# Patient Record
Sex: FEMALE | Race: WHITE | URBAN - METROPOLITAN AREA
[De-identification: names, ages, dates, MRNs, and addresses within clinical notes are randomized per-mention and may not be internally consistent; named-entity substitution may affect disease eponyms.]

---

## 2017-04-18 ENCOUNTER — OUTPATIENT (OUTPATIENT)
Dept: OUTPATIENT SERVICES | Facility: HOSPITAL | Age: 62
LOS: 1 days | Discharge: HOME | End: 2017-04-18

## 2017-06-27 DIAGNOSIS — Z12.31 ENCOUNTER FOR SCREENING MAMMOGRAM FOR MALIGNANT NEOPLASM OF BREAST: ICD-10-CM

## 2018-02-23 ENCOUNTER — EMERGENCY (EMERGENCY)
Facility: HOSPITAL | Age: 63
LOS: 0 days | Discharge: HOME | End: 2018-02-23
Attending: EMERGENCY MEDICINE | Admitting: GENERAL PRACTICE

## 2018-02-23 VITALS
DIASTOLIC BLOOD PRESSURE: 66 MMHG | SYSTOLIC BLOOD PRESSURE: 141 MMHG | TEMPERATURE: 96 F | RESPIRATION RATE: 20 BRPM | HEART RATE: 78 BPM | OXYGEN SATURATION: 98 %

## 2018-02-23 VITALS
DIASTOLIC BLOOD PRESSURE: 61 MMHG | SYSTOLIC BLOOD PRESSURE: 133 MMHG | RESPIRATION RATE: 18 BRPM | HEART RATE: 80 BPM | TEMPERATURE: 98 F | OXYGEN SATURATION: 100 %

## 2018-02-23 DIAGNOSIS — Z23 ENCOUNTER FOR IMMUNIZATION: ICD-10-CM
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
## 2018-02-23 DIAGNOSIS — R55 SYNCOPE AND COLLAPSE: ICD-10-CM
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
## 2018-02-23 DIAGNOSIS — S01.01XA LACERATION WITHOUT FOREIGN BODY OF SCALP, INITIAL ENCOUNTER: ICD-10-CM

## 2018-02-23 DIAGNOSIS — Z88.2 ALLERGY STATUS TO SULFONAMIDES: ICD-10-CM
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
## 2018-02-23 DIAGNOSIS — Z88.0 ALLERGY STATUS TO PENICILLIN: ICD-10-CM

## 2018-02-23 DIAGNOSIS — S09.90XA UNSPECIFIED INJURY OF HEAD, INITIAL ENCOUNTER: ICD-10-CM

## 2018-02-23 RX ORDER — TETANUS TOXOID, REDUCED DIPHTHERIA TOXOID AND ACELLULAR PERTUSSIS VACCINE, ADSORBED 5; 2.5; 8; 8; 2.5 [IU]/.5ML; [IU]/.5ML; UG/.5ML; UG/.5ML; UG/.5ML
0.5 SUSPENSION INTRAMUSCULAR ONCE
Qty: 0 | Refills: 0 | Status: COMPLETED | OUTPATIENT
Start: 2018-02-23 | End: 2018-02-23

## 2018-02-23 RX ORDER — SODIUM CHLORIDE 9 MG/ML
1000 INJECTION INTRAMUSCULAR; INTRAVENOUS; SUBCUTANEOUS ONCE
Qty: 0 | Refills: 0 | Status: COMPLETED | OUTPATIENT
Start: 2018-02-23 | End: 2018-02-23

## 2018-02-23 RX ADMIN — SODIUM CHLORIDE 6000 MILLILITER(S): 9 INJECTION INTRAMUSCULAR; INTRAVENOUS; SUBCUTANEOUS at 08:15

## 2018-02-23 RX ADMIN — TETANUS TOXOID, REDUCED DIPHTHERIA TOXOID AND ACELLULAR PERTUSSIS VACCINE, ADSORBED 0.5 MILLILITER(S): 5; 2.5; 8; 8; 2.5 SUSPENSION INTRAMUSCULAR at 07:14

## 2018-02-23 NOTE — ED PROVIDER NOTE - CARE PLAN
Principal Discharge DX:	Scalp laceration, initial encounter  Secondary Diagnosis:	Vasovagal syncope  Secondary Diagnosis:	Closed head injury, initial encounter

## 2018-02-23 NOTE — ED ADULT NURSE NOTE - OBJECTIVE STATEMENT
pt states she was recently in [facility] and was diagnosed with e.coli in stool, pt has loose bm and abd bloating. approx 3am pt felt abd cramps and ambulated to the bathroom when she felt dizzed and fainted, pt had + LOC, spent approx 1hr in bathroom with diarrhea and vomiting. pt noted to had L sided head lac with mild bleeding noted, pt currently c/o headache and dizziness as well as L elbow pain

## 2018-02-23 NOTE — ED PROVIDER NOTE - NS ED ROS FT
Constitutional: (-) fever  Eyes/ENT: (-) blurry vision, (-) epistaxis  Cardiovascular: (-) chest pain, (+) syncope  Respiratory: (-) cough, (-) shortness of breath  Gastrointestinal: (-) vomiting, (-) diarrhea  Musculoskeletal: (-) neck pain, (-) back pain, (-) joint pain  Integumentary: (-) rash, (-) edema  Neurological: (-) headache, (-) altered mental status  Psychiatric: (-) hallucinations  Allergic/Immunologic: (-) pruritus

## 2018-02-23 NOTE — ED PROVIDER NOTE - PHYSICAL EXAMINATION
Vital Signs: I have reviewed the initial vital signs.  Constitutional: well-nourished, appears stated age, no acute distress  Cardiovascular: regular rate, regular rhythm, well-perfused extremities  Respiratory: unlabored respiratory effort, clear to auscultation bilaterally  Gastrointestinal: soft, non-tender abdomen, no pulsatile mass  Musculoskeletal: supple neck, no lower extremity edema, no calf tenderness  Integumentary: warm, dry, no rash; 2cm scalp laceration  Neurologic: awake, alert, cranial nerves II-XII grossly intact, extremities’ motor and sensory functions grossly intact  Psychiatric: appropriate mood, appropriate affect

## 2018-03-08 ENCOUNTER — EMERGENCY (EMERGENCY)
Facility: HOSPITAL | Age: 63
LOS: 0 days | Discharge: HOME | End: 2018-03-08
Attending: EMERGENCY MEDICINE | Admitting: GENERAL PRACTICE

## 2018-03-08 VITALS
SYSTOLIC BLOOD PRESSURE: 145 MMHG | RESPIRATION RATE: 18 BRPM | DIASTOLIC BLOOD PRESSURE: 70 MMHG | OXYGEN SATURATION: 100 % | HEART RATE: 68 BPM | TEMPERATURE: 96 F

## 2018-03-08 DIAGNOSIS — R51 HEADACHE: ICD-10-CM

## 2018-03-08 DIAGNOSIS — S06.0X9A CONCUSSION WITH LOSS OF CONSCIOUSNESS OF UNSPECIFIED DURATION, INITIAL ENCOUNTER: ICD-10-CM
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
## 2018-03-08 DIAGNOSIS — Z88.2 ALLERGY STATUS TO SULFONAMIDES: ICD-10-CM
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
## 2018-03-08 DIAGNOSIS — Z88.1 ALLERGY STATUS TO OTHER ANTIBIOTIC AGENTS STATUS: ICD-10-CM
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
## 2018-03-08 LAB
ALBUMIN SERPL ELPH-MCNC: 4.2 G/DL — SIGNIFICANT CHANGE UP (ref 3–5.5)
ALP SERPL-CCNC: 97 U/L — SIGNIFICANT CHANGE UP (ref 30–115)
ALT FLD-CCNC: 34 U/L — SIGNIFICANT CHANGE UP (ref 0–41)
ANION GAP SERPL CALC-SCNC: 5 MMOL/L — LOW (ref 7–14)
AST SERPL-CCNC: 31 U/L — SIGNIFICANT CHANGE UP (ref 0–41)
BASOPHILS # BLD AUTO: 0.03 K/UL — SIGNIFICANT CHANGE UP (ref 0–0.2)
BASOPHILS NFR BLD AUTO: 0.5 % — SIGNIFICANT CHANGE UP (ref 0–1)
BILIRUB SERPL-MCNC: 0.4 MG/DL — SIGNIFICANT CHANGE UP (ref 0.2–1.2)
BUN SERPL-MCNC: 8 MG/DL — LOW (ref 10–20)
CALCIUM SERPL-MCNC: 9.9 MG/DL — SIGNIFICANT CHANGE UP (ref 8.5–10.1)
CHLORIDE SERPL-SCNC: 107 MMOL/L — SIGNIFICANT CHANGE UP (ref 98–110)
CO2 SERPL-SCNC: 29 MMOL/L — SIGNIFICANT CHANGE UP (ref 17–32)
CREAT SERPL-MCNC: 0.8 MG/DL — SIGNIFICANT CHANGE UP (ref 0.7–1.5)
EOSINOPHIL # BLD AUTO: 0.18 K/UL — SIGNIFICANT CHANGE UP (ref 0–0.7)
EOSINOPHIL NFR BLD AUTO: 3.1 % — SIGNIFICANT CHANGE UP (ref 0–8)
GLUCOSE SERPL-MCNC: 95 MG/DL — SIGNIFICANT CHANGE UP (ref 70–110)
HCT VFR BLD CALC: 38.7 % — SIGNIFICANT CHANGE UP (ref 37–47)
HGB BLD-MCNC: 13.1 G/DL — SIGNIFICANT CHANGE UP (ref 12–16)
IMM GRANULOCYTES NFR BLD AUTO: 0.2 % — SIGNIFICANT CHANGE UP (ref 0.1–0.3)
LYMPHOCYTES # BLD AUTO: 1.3 K/UL — SIGNIFICANT CHANGE UP (ref 1.2–3.4)
LYMPHOCYTES # BLD AUTO: 22.4 % — SIGNIFICANT CHANGE UP (ref 20.5–51.1)
MCHC RBC-ENTMCNC: 30.7 PG — SIGNIFICANT CHANGE UP (ref 27–31)
MCHC RBC-ENTMCNC: 33.9 G/DL — SIGNIFICANT CHANGE UP (ref 32–37)
MCV RBC AUTO: 90.6 FL — SIGNIFICANT CHANGE UP (ref 81–99)
MONOCYTES # BLD AUTO: 0.48 K/UL — SIGNIFICANT CHANGE UP (ref 0.1–0.6)
MONOCYTES NFR BLD AUTO: 8.3 % — SIGNIFICANT CHANGE UP (ref 1.7–9.3)
NEUTROPHILS # BLD AUTO: 3.81 K/UL — SIGNIFICANT CHANGE UP (ref 1.4–6.5)
NEUTROPHILS NFR BLD AUTO: 65.5 % — SIGNIFICANT CHANGE UP (ref 42.2–75.2)
NRBC # BLD: 0 /100 WBCS — SIGNIFICANT CHANGE UP (ref 0–0)
PLATELET # BLD AUTO: 289 K/UL — SIGNIFICANT CHANGE UP (ref 130–400)
POTASSIUM SERPL-MCNC: 4 MMOL/L — SIGNIFICANT CHANGE UP (ref 3.5–5)
POTASSIUM SERPL-SCNC: 4 MMOL/L — SIGNIFICANT CHANGE UP (ref 3.5–5)
PROT SERPL-MCNC: 6.9 G/DL — SIGNIFICANT CHANGE UP (ref 6–8)
RBC # BLD: 4.27 M/UL — SIGNIFICANT CHANGE UP (ref 4.2–5.4)
RBC # FLD: 13 % — SIGNIFICANT CHANGE UP (ref 11.5–14.5)
SODIUM SERPL-SCNC: 141 MMOL/L — SIGNIFICANT CHANGE UP (ref 135–146)
WBC # BLD: 5.81 K/UL — SIGNIFICANT CHANGE UP (ref 4.8–10.8)
WBC # FLD AUTO: 5.81 K/UL — SIGNIFICANT CHANGE UP (ref 4.8–10.8)

## 2018-03-08 RX ORDER — METOCLOPRAMIDE HCL 10 MG
10 TABLET ORAL ONCE
Qty: 0 | Refills: 0 | Status: COMPLETED | OUTPATIENT
Start: 2018-03-08 | End: 2018-03-08

## 2018-03-08 RX ORDER — SODIUM CHLORIDE 9 MG/ML
1000 INJECTION INTRAMUSCULAR; INTRAVENOUS; SUBCUTANEOUS ONCE
Qty: 0 | Refills: 0 | Status: COMPLETED | OUTPATIENT
Start: 2018-03-08 | End: 2018-03-08

## 2018-03-08 RX ADMIN — Medication 104 MILLIGRAM(S): at 15:44

## 2018-03-08 RX ADMIN — SODIUM CHLORIDE 2400 MILLILITER(S): 9 INJECTION INTRAMUSCULAR; INTRAVENOUS; SUBCUTANEOUS at 16:14

## 2018-03-08 NOTE — ED ADULT NURSE NOTE - OBJECTIVE STATEMENT
pt came to the ER today referred from PMD for MRI of head and c/o dizziness and headache. . pt denies any chest pain at this time. pt states she fell x2 weeks ago received stiches to back of head feels that head is swollen . locates pain to left side of head at site of stitches.

## 2018-03-08 NOTE — ED PROVIDER NOTE - PHYSICAL EXAMINATION
CON: ao x 3, HENMT: clear oropharynx, soft neck, HEAD: atraumatic, CV: rrr, equal pulses b/l, RESP: cta b/l, GI:  soft, nontender, no rebound, no guarding, SKIN: well healing laceration to left posterior scalp, MSK: no deformities, NEURO: motor 5/5 X4, sensation grossly intact CON: ao x 3, HENMT: clear oropharynx, soft neck, HEAD: atraumatic, CV: rrr, equal pulses b/l, RESP: cta b/l, GI:  soft, nontender, no rebound, no guarding, SKIN: well healing laceration to left posterior scalp, MSK: no deformities, NEURO: motor 5/5 X4, sensation grossly intact, no nystagmus, normal finger to nose, normal speech, mild ataxia with walking. no ataxia while sitting

## 2018-03-08 NOTE — ED PROVIDER NOTE - PROGRESS NOTE DETAILS
Plan- Explain to pt that we are unable to get MRI, will repeat CT head to insure no delayed bleeding although SX more likely concussion-like, EKG and screening labs. pt comftorable with dc and outpt f/u

## 2018-03-08 NOTE — ED PROVIDER NOTE - NS ED ROS FT
Constitutional: No fever/chills.  Eyes: No visual changes.   ENMT: No hearing changes.   Cardiac: No chest pain, SOB or edema.  Respiratory: No cough or respiratory distress.   GI: No nausea, vomiting, diarrhea or abdominal pain.  : No dysuria, frequency or burning.  MS: No myalgia, muscle weakness, joint pain or back pain.  Neuro: See HPI.   Skin: See HPI.   Except as documented in the HPI, all other systems are negative.

## 2018-11-28 ENCOUNTER — OUTPATIENT (OUTPATIENT)
Dept: OUTPATIENT SERVICES | Facility: HOSPITAL | Age: 63
LOS: 1 days | Discharge: HOME | End: 2018-11-28

## 2018-11-28 DIAGNOSIS — Z12.31 ENCOUNTER FOR SCREENING MAMMOGRAM FOR MALIGNANT NEOPLASM OF BREAST: ICD-10-CM

## 2018-11-28 PROBLEM — M50.20 OTHER CERVICAL DISC DISPLACEMENT, UNSPECIFIED CERVICAL REGION: Chronic | Status: ACTIVE | Noted: 2018-02-23

## 2018-11-28 PROBLEM — R55 SYNCOPE AND COLLAPSE: Chronic | Status: ACTIVE | Noted: 2018-02-23

## 2018-11-28 PROBLEM — M19.90 UNSPECIFIED OSTEOARTHRITIS, UNSPECIFIED SITE: Chronic | Status: ACTIVE | Noted: 2018-02-23

## 2018-11-28 PROBLEM — G56.00 CARPAL TUNNEL SYNDROME, UNSPECIFIED UPPER LIMB: Chronic | Status: ACTIVE | Noted: 2018-02-23

## 2018-12-05 ENCOUNTER — OUTPATIENT (OUTPATIENT)
Dept: OUTPATIENT SERVICES | Facility: HOSPITAL | Age: 63
LOS: 1 days | Discharge: HOME | End: 2018-12-05

## 2018-12-05 DIAGNOSIS — M06.9 RHEUMATOID ARTHRITIS, UNSPECIFIED: ICD-10-CM

## 2018-12-05 DIAGNOSIS — R79.89 OTHER SPECIFIED ABNORMAL FINDINGS OF BLOOD CHEMISTRY: ICD-10-CM

## 2018-12-05 DIAGNOSIS — M34.9 SYSTEMIC SCLEROSIS, UNSPECIFIED: ICD-10-CM

## 2018-12-05 DIAGNOSIS — M32.10 SYSTEMIC LUPUS ERYTHEMATOSUS, ORGAN OR SYSTEM INVOLVEMENT UNSPECIFIED: ICD-10-CM

## 2018-12-05 DIAGNOSIS — M34.81 SYSTEMIC SCLEROSIS WITH LUNG INVOLVEMENT: ICD-10-CM

## 2018-12-05 DIAGNOSIS — E55.9 VITAMIN D DEFICIENCY, UNSPECIFIED: ICD-10-CM

## 2018-12-05 DIAGNOSIS — M06.4 INFLAMMATORY POLYARTHROPATHY: ICD-10-CM

## 2018-12-05 DIAGNOSIS — R80.9 PROTEINURIA, UNSPECIFIED: ICD-10-CM

## 2018-12-05 DIAGNOSIS — D84.1 DEFECTS IN THE COMPLEMENT SYSTEM: ICD-10-CM

## 2019-02-16 ENCOUNTER — OUTPATIENT (OUTPATIENT)
Dept: OUTPATIENT SERVICES | Facility: HOSPITAL | Age: 64
LOS: 1 days | Discharge: HOME | End: 2019-02-16

## 2019-02-16 DIAGNOSIS — R79.89 OTHER SPECIFIED ABNORMAL FINDINGS OF BLOOD CHEMISTRY: ICD-10-CM

## 2019-02-16 DIAGNOSIS — M06.4 INFLAMMATORY POLYARTHROPATHY: ICD-10-CM

## 2019-02-16 DIAGNOSIS — D50.8 OTHER IRON DEFICIENCY ANEMIAS: ICD-10-CM

## 2019-04-03 ENCOUNTER — OUTPATIENT (OUTPATIENT)
Dept: OUTPATIENT SERVICES | Facility: HOSPITAL | Age: 64
LOS: 1 days | Discharge: HOME | End: 2019-04-03

## 2019-04-03 DIAGNOSIS — R79.89 OTHER SPECIFIED ABNORMAL FINDINGS OF BLOOD CHEMISTRY: ICD-10-CM

## 2019-10-14 ENCOUNTER — OUTPATIENT (OUTPATIENT)
Dept: OUTPATIENT SERVICES | Facility: HOSPITAL | Age: 64
LOS: 1 days | Discharge: HOME | End: 2019-10-14

## 2019-10-14 DIAGNOSIS — R79.89 OTHER SPECIFIED ABNORMAL FINDINGS OF BLOOD CHEMISTRY: ICD-10-CM

## 2019-10-14 DIAGNOSIS — R68.89 OTHER GENERAL SYMPTOMS AND SIGNS: ICD-10-CM

## 2019-10-15 ENCOUNTER — OUTPATIENT (OUTPATIENT)
Dept: OUTPATIENT SERVICES | Facility: HOSPITAL | Age: 64
LOS: 1 days | Discharge: HOME | End: 2019-10-15

## 2019-10-15 DIAGNOSIS — M34.81 SYSTEMIC SCLEROSIS WITH LUNG INVOLVEMENT: ICD-10-CM

## 2019-10-15 DIAGNOSIS — M06.4 INFLAMMATORY POLYARTHROPATHY: ICD-10-CM

## 2019-10-15 DIAGNOSIS — D84.1 DEFECTS IN THE COMPLEMENT SYSTEM: ICD-10-CM

## 2019-11-25 ENCOUNTER — OUTPATIENT (OUTPATIENT)
Dept: OUTPATIENT SERVICES | Facility: HOSPITAL | Age: 64
LOS: 1 days | Discharge: HOME | End: 2019-11-25

## 2019-11-25 DIAGNOSIS — E78.2 MIXED HYPERLIPIDEMIA: ICD-10-CM

## 2019-11-25 DIAGNOSIS — Z00.00 ENCOUNTER FOR GENERAL ADULT MEDICAL EXAMINATION WITHOUT ABNORMAL FINDINGS: ICD-10-CM

## 2019-11-25 DIAGNOSIS — Z79.899 OTHER LONG TERM (CURRENT) DRUG THERAPY: ICD-10-CM

## 2019-11-25 DIAGNOSIS — R53.81 OTHER MALAISE: ICD-10-CM

## 2019-11-25 DIAGNOSIS — R53.83 OTHER FATIGUE: ICD-10-CM

## 2019-11-25 DIAGNOSIS — E11.9 TYPE 2 DIABETES MELLITUS WITHOUT COMPLICATIONS: ICD-10-CM

## 2019-12-20 ENCOUNTER — OUTPATIENT (OUTPATIENT)
Dept: OUTPATIENT SERVICES | Facility: HOSPITAL | Age: 64
LOS: 1 days | Discharge: HOME | End: 2019-12-20
Payer: MEDICARE

## 2019-12-20 DIAGNOSIS — Z12.31 ENCOUNTER FOR SCREENING MAMMOGRAM FOR MALIGNANT NEOPLASM OF BREAST: ICD-10-CM

## 2019-12-20 PROCEDURE — 77063 BREAST TOMOSYNTHESIS BI: CPT | Mod: 26

## 2019-12-20 PROCEDURE — 77067 SCR MAMMO BI INCL CAD: CPT | Mod: 26

## 2020-07-06 NOTE — ED PROVIDER NOTE - OBJECTIVE STATEMENT
Pt is a 63yo female with hx of sarcoidosis and vasovagal syncope who woke up from sleep to use the bathroom and got up but felt clammy and fainted when walking to the bathroom.  No CP/SOB/palpitations.  Pt asymptomatic now but had hit the back of her head and sustained a laceration.  No vomiting, no AMS.
egd w/bx

## 2021-02-02 ENCOUNTER — NON-APPOINTMENT (OUTPATIENT)
Age: 66
End: 2021-02-02

## 2021-02-03 ENCOUNTER — NON-APPOINTMENT (OUTPATIENT)
Age: 66
End: 2021-02-03

## 2021-02-03 PROBLEM — Z00.00 ENCOUNTER FOR PREVENTIVE HEALTH EXAMINATION: Status: ACTIVE | Noted: 2021-02-03

## 2021-02-04 ENCOUNTER — NON-APPOINTMENT (OUTPATIENT)
Age: 66
End: 2021-02-04

## 2021-02-04 ENCOUNTER — APPOINTMENT (OUTPATIENT)
Dept: OTOLARYNGOLOGY | Facility: CLINIC | Age: 66
End: 2021-02-04
Payer: MEDICARE

## 2021-02-04 DIAGNOSIS — F32.9 ANXIETY DISORDER, UNSPECIFIED: ICD-10-CM

## 2021-02-04 DIAGNOSIS — D64.9 ANEMIA, UNSPECIFIED: ICD-10-CM

## 2021-02-04 DIAGNOSIS — F41.9 ANXIETY DISORDER, UNSPECIFIED: ICD-10-CM

## 2021-02-04 DIAGNOSIS — J34.2 DEVIATED NASAL SEPTUM: ICD-10-CM

## 2021-02-04 PROCEDURE — 99204 OFFICE O/P NEW MOD 45 MIN: CPT | Mod: 25

## 2021-02-04 PROCEDURE — 99072 ADDL SUPL MATRL&STAF TM PHE: CPT

## 2021-02-04 PROCEDURE — 31231 NASAL ENDOSCOPY DX: CPT

## 2021-02-04 RX ORDER — METHOTREXATE 2.5 MG/1
TABLET ORAL
Refills: 0 | Status: ACTIVE | COMMUNITY

## 2021-02-04 NOTE — HISTORY OF PRESENT ILLNESS
[de-identified] : Patient presents today with c/o post nasal drip. Patient states it has been going on for years. Recently becoming worse. She admits Sjogren which is making things worse- excessive dry mouth. Patient always clearing her throat. She feels something stuck in her throat. She has used nasal spray Flonase last year with no improvement. She does not know if she has any allergies, she has never been tested. She has not been treated with abx for these symptoms recently

## 2021-02-04 NOTE — PROCEDURE
[Flexible Endoscope] : examined with the flexible endoscope [Congested] : congested [Normal] : the paranasal sinuses had no abnormalities

## 2021-02-05 RX ORDER — AZELASTINE HYDROCHLORIDE 205.5 UG/1
0.15 SPRAY, METERED NASAL
Qty: 2 | Refills: 6 | Status: ACTIVE | COMMUNITY
Start: 2021-02-04 | End: 1900-01-01

## 2021-03-08 ENCOUNTER — APPOINTMENT (OUTPATIENT)
Dept: OTOLARYNGOLOGY | Facility: CLINIC | Age: 66
End: 2021-03-08
Payer: MEDICARE

## 2021-03-08 DIAGNOSIS — K21.9 GASTRO-ESOPHAGEAL REFLUX DISEASE W/OUT ESOPHAGITIS: ICD-10-CM

## 2021-03-08 PROCEDURE — 99214 OFFICE O/P EST MOD 30 MIN: CPT | Mod: 25

## 2021-03-08 PROCEDURE — 99072 ADDL SUPL MATRL&STAF TM PHE: CPT

## 2021-03-08 PROCEDURE — 31231 NASAL ENDOSCOPY DX: CPT

## 2021-03-08 NOTE — PROCEDURE
[Recalcitrant Symptoms] : recalcitrant symptoms  [Congested] : congested [Flexible Endoscope] : examined with the flexible endoscope [Normal] : posterior cricoid area had healthy pink mucosa in the interarytenoid area and the esophageal inlet

## 2021-03-08 NOTE — HISTORY OF PRESENT ILLNESS
[FreeTextEntry1] : Patient presents today following up on post nasal drip. Patient denies any improvement. Patient has been using nasal spray and allergy medication with no changes.

## 2021-03-08 NOTE — ASSESSMENT
[FreeTextEntry1] : Risks, benefits, and ablation of nasal swelling turbinate reduction were explained including but not limited to bleeding, infection, persistent symptoms, numbness, perforation, change in smell, change in taste, need for additional surgery, etc...\par

## 2021-03-25 ENCOUNTER — APPOINTMENT (OUTPATIENT)
Dept: OTOLARYNGOLOGY | Facility: CLINIC | Age: 66
End: 2021-03-25
Payer: MEDICARE

## 2021-03-25 PROCEDURE — 30930 THER FX NASAL INF TURBINATE: CPT | Mod: 59

## 2021-03-25 PROCEDURE — 99072 ADDL SUPL MATRL&STAF TM PHE: CPT

## 2021-03-25 PROCEDURE — 30802 ABLATE INF TURBINATE SUBMUC: CPT

## 2021-03-25 PROCEDURE — 30117 REMOVAL OF INTRANASAL LESION: CPT

## 2021-04-08 ENCOUNTER — APPOINTMENT (OUTPATIENT)
Dept: OTOLARYNGOLOGY | Facility: CLINIC | Age: 66
End: 2021-04-08
Payer: MEDICARE

## 2021-04-08 DIAGNOSIS — J30.0 VASOMOTOR RHINITIS: ICD-10-CM

## 2021-04-08 DIAGNOSIS — Z98.890 OTHER SPECIFIED POSTPROCEDURAL STATES: ICD-10-CM

## 2021-04-08 PROCEDURE — 99024 POSTOP FOLLOW-UP VISIT: CPT

## 2021-04-08 PROCEDURE — 31237 NSL/SINS NDSC SURG BX POLYPC: CPT | Mod: 50,58

## 2021-04-08 NOTE — HISTORY OF PRESENT ILLNESS
[FreeTextEntry1] : Patient presents today following up on post nasal drip. Patient is s/p turb reduction, clarifix 3/25/21. Patient c/o nasal congestion but improving . Patient still has post nasal drip but ut is much less severe.

## 2021-04-08 NOTE — PHYSICAL EXAM
[Nasal Endoscopy Performed] : nasal endoscopy was performed, see procedure section for findings [Midline] : trachea located in midline position [Normal] : no rashes [de-identified] : healing  [de-identified] : scabbing

## 2021-04-08 NOTE — PROCEDURE
[Recalcitrant Symptoms] : recalcitrant symptoms  [Rigid Endoscope] : examined with a rigid endoscope [Nasal Mucosa] : bilateral purulence [Paranasal Sinuses Middle Meatus] : bilateral purulence [Bilateral] : bilateral debridement of the nasal cavity [Normal] : the paranasal sinuses had no abnormalities [Severe] : severe [Ariel] : on both sides [Removed] : which was removed

## 2021-11-26 ENCOUNTER — EMERGENCY (EMERGENCY)
Facility: HOSPITAL | Age: 66
LOS: 0 days | Discharge: HOME | End: 2021-11-26
Payer: MEDICARE

## 2021-11-26 DIAGNOSIS — Z02.9 ENCOUNTER FOR ADMINISTRATIVE EXAMINATIONS, UNSPECIFIED: ICD-10-CM

## 2021-11-26 PROCEDURE — 99284 EMERGENCY DEPT VISIT MOD MDM: CPT

## 2021-12-20 ENCOUNTER — TRANSCRIPTION ENCOUNTER (OUTPATIENT)
Age: 66
End: 2021-12-20

## 2022-02-17 ENCOUNTER — APPOINTMENT (OUTPATIENT)
Age: 67
End: 2022-02-17
Payer: COMMERCIAL

## 2022-02-17 PROCEDURE — 99213 OFFICE O/P EST LOW 20 MIN: CPT | Mod: 25

## 2022-02-17 PROCEDURE — 71046 X-RAY EXAM CHEST 2 VIEWS: CPT

## 2022-02-17 NOTE — DISCUSSION/SUMMARY
[FreeTextEntry1] : SARCOIDOSIS STABLE\par CTD\par SMOKER/ CHEST CT / PFT RO COPD\par ON PLAQUENIL WAS ON MTX

## 2022-02-17 NOTE — PROCEDURE
[FreeTextEntry1] : CXR PA/ LAT SP COVID\par \par NO INFLIRATES/ EFFUSION\par \par IMP\par \par HYPERINFLATED LUNG

## 2022-02-17 NOTE — HISTORY OF PRESENT ILLNESS
[TextBox_4] : SAW MAC BEFORE SARCOIDOSIS/ SP MED, SJOGREN ON PLAQUENIL WAS ON MTX\par SP COVID DOING WELL\par CO JOINT PAIN

## 2022-03-02 ENCOUNTER — OUTPATIENT (OUTPATIENT)
Dept: OUTPATIENT SERVICES | Facility: HOSPITAL | Age: 67
LOS: 1 days | Discharge: HOME | End: 2022-03-02
Payer: MEDICARE

## 2022-03-02 ENCOUNTER — RESULT REVIEW (OUTPATIENT)
Age: 67
End: 2022-03-02

## 2022-03-02 DIAGNOSIS — R91.1 SOLITARY PULMONARY NODULE: ICD-10-CM

## 2022-03-02 PROCEDURE — 71250 CT THORAX DX C-: CPT | Mod: 26

## 2022-03-06 ENCOUNTER — TRANSCRIPTION ENCOUNTER (OUTPATIENT)
Age: 67
End: 2022-03-06

## 2022-03-11 ENCOUNTER — OUTPATIENT (OUTPATIENT)
Dept: OUTPATIENT SERVICES | Facility: HOSPITAL | Age: 67
LOS: 1 days | Discharge: HOME | End: 2022-03-11
Payer: MEDICARE

## 2022-03-11 DIAGNOSIS — Z12.31 ENCOUNTER FOR SCREENING MAMMOGRAM FOR MALIGNANT NEOPLASM OF BREAST: ICD-10-CM

## 2022-03-11 PROCEDURE — 77063 BREAST TOMOSYNTHESIS BI: CPT | Mod: 26

## 2022-03-11 PROCEDURE — 77067 SCR MAMMO BI INCL CAD: CPT | Mod: 26

## 2022-05-01 ENCOUNTER — NON-APPOINTMENT (OUTPATIENT)
Age: 67
End: 2022-05-01

## 2022-11-11 ENCOUNTER — APPOINTMENT (OUTPATIENT)
Age: 67
End: 2022-11-11

## 2022-11-11 VITALS
WEIGHT: 136 LBS | SYSTOLIC BLOOD PRESSURE: 116 MMHG | HEART RATE: 66 BPM | DIASTOLIC BLOOD PRESSURE: 64 MMHG | HEIGHT: 65 IN | OXYGEN SATURATION: 98 % | RESPIRATION RATE: 14 BRPM | BODY MASS INDEX: 22.66 KG/M2

## 2022-11-11 PROCEDURE — 99213 OFFICE O/P EST LOW 20 MIN: CPT

## 2022-11-11 NOTE — REASON FOR VISIT
[Follow-Up] : a follow-up visit [Cough] : cough [Sarcoidosis] : sarcoidosis [TextBox_44] : SARCOIDOSIS/ SMOKER

## 2022-11-11 NOTE — HISTORY OF PRESENT ILLNESS
[TextBox_4] : SAW MAC BEFORE SARCOIDOSIS/ SP MED, SJOGREN ON PLAQUENIL WAS ON MTX NOW OFF\par SP COVID DOING WELL\par CO JOINT PAIN\par COUGH/ PND

## 2022-11-11 NOTE — DISCUSSION/SUMMARY
[FreeTextEntry1] : SARCOIDOSIS CHEST CT REVIEWED\par COUGH/ PND/ MUCINEX\par LUNG NODULE/ CHEST CT\par SMOKER

## 2022-11-25 ENCOUNTER — EMERGENCY (EMERGENCY)
Facility: HOSPITAL | Age: 67
LOS: 0 days | Discharge: HOME | End: 2022-11-26
Attending: STUDENT IN AN ORGANIZED HEALTH CARE EDUCATION/TRAINING PROGRAM | Admitting: STUDENT IN AN ORGANIZED HEALTH CARE EDUCATION/TRAINING PROGRAM

## 2022-11-25 VITALS
WEIGHT: 164.91 LBS | SYSTOLIC BLOOD PRESSURE: 153 MMHG | HEART RATE: 112 BPM | DIASTOLIC BLOOD PRESSURE: 65 MMHG | OXYGEN SATURATION: 96 % | TEMPERATURE: 99 F | RESPIRATION RATE: 20 BRPM

## 2022-11-25 DIAGNOSIS — Z88.0 ALLERGY STATUS TO PENICILLIN: ICD-10-CM

## 2022-11-25 DIAGNOSIS — Z87.39 PERSONAL HISTORY OF OTHER DISEASES OF THE MUSCULOSKELETAL SYSTEM AND CONNECTIVE TISSUE: ICD-10-CM

## 2022-11-25 DIAGNOSIS — R50.9 FEVER, UNSPECIFIED: ICD-10-CM

## 2022-11-25 DIAGNOSIS — R05.1 ACUTE COUGH: ICD-10-CM

## 2022-11-25 DIAGNOSIS — F17.200 NICOTINE DEPENDENCE, UNSPECIFIED, UNCOMPLICATED: ICD-10-CM

## 2022-11-25 DIAGNOSIS — D86.9 SARCOIDOSIS, UNSPECIFIED: ICD-10-CM

## 2022-11-25 DIAGNOSIS — J18.9 PNEUMONIA, UNSPECIFIED ORGANISM: ICD-10-CM

## 2022-11-25 DIAGNOSIS — Z20.822 CONTACT WITH AND (SUSPECTED) EXPOSURE TO COVID-19: ICD-10-CM

## 2022-11-25 DIAGNOSIS — M19.90 UNSPECIFIED OSTEOARTHRITIS, UNSPECIFIED SITE: ICD-10-CM

## 2022-11-25 DIAGNOSIS — Z88.2 ALLERGY STATUS TO SULFONAMIDES: ICD-10-CM

## 2022-11-25 DIAGNOSIS — J10.1 INFLUENZA DUE TO OTHER IDENTIFIED INFLUENZA VIRUS WITH OTHER RESPIRATORY MANIFESTATIONS: ICD-10-CM

## 2022-11-25 LAB
ALBUMIN SERPL ELPH-MCNC: 4 G/DL — SIGNIFICANT CHANGE UP (ref 3.5–5.2)
ALP SERPL-CCNC: 416 U/L — HIGH (ref 30–115)
ALT FLD-CCNC: 36 U/L — SIGNIFICANT CHANGE UP (ref 0–41)
ANION GAP SERPL CALC-SCNC: 10 MMOL/L — SIGNIFICANT CHANGE UP (ref 7–14)
AST SERPL-CCNC: 25 U/L — SIGNIFICANT CHANGE UP (ref 0–41)
BASE EXCESS BLDV CALC-SCNC: 2.3 MMOL/L — SIGNIFICANT CHANGE UP (ref -2–3)
BASOPHILS # BLD AUTO: 0.02 K/UL — SIGNIFICANT CHANGE UP (ref 0–0.2)
BASOPHILS NFR BLD AUTO: 0.4 % — SIGNIFICANT CHANGE UP (ref 0–1)
BILIRUB SERPL-MCNC: <0.2 MG/DL — SIGNIFICANT CHANGE UP (ref 0.2–1.2)
BUN SERPL-MCNC: 15 MG/DL — SIGNIFICANT CHANGE UP (ref 10–20)
CA-I SERPL-SCNC: 1.19 MMOL/L — SIGNIFICANT CHANGE UP (ref 1.15–1.33)
CALCIUM SERPL-MCNC: 8.9 MG/DL — SIGNIFICANT CHANGE UP (ref 8.4–10.5)
CHLORIDE SERPL-SCNC: 103 MMOL/L — SIGNIFICANT CHANGE UP (ref 98–110)
CO2 SERPL-SCNC: 26 MMOL/L — SIGNIFICANT CHANGE UP (ref 17–32)
CREAT SERPL-MCNC: 0.7 MG/DL — SIGNIFICANT CHANGE UP (ref 0.7–1.5)
EGFR: 95 ML/MIN/1.73M2 — SIGNIFICANT CHANGE UP
EOSINOPHIL # BLD AUTO: 0.16 K/UL — SIGNIFICANT CHANGE UP (ref 0–0.7)
EOSINOPHIL NFR BLD AUTO: 3.6 % — SIGNIFICANT CHANGE UP (ref 0–8)
FLUAV AG NPH QL: DETECTED
FLUBV AG NPH QL: SIGNIFICANT CHANGE UP
GAS PNL BLDV: 136 MMOL/L — SIGNIFICANT CHANGE UP (ref 136–145)
GAS PNL BLDV: SIGNIFICANT CHANGE UP
GLUCOSE SERPL-MCNC: 128 MG/DL — HIGH (ref 70–99)
HCO3 BLDV-SCNC: 27 MMOL/L — SIGNIFICANT CHANGE UP (ref 22–29)
HCT VFR BLD CALC: 35.3 % — LOW (ref 37–47)
HCT VFR BLDA CALC: 38 % — LOW (ref 39–51)
HGB BLD CALC-MCNC: 12.6 G/DL — SIGNIFICANT CHANGE UP (ref 12.6–17.4)
HGB BLD-MCNC: 11.7 G/DL — LOW (ref 12–16)
IMM GRANULOCYTES NFR BLD AUTO: 1.3 % — HIGH (ref 0.1–0.3)
LACTATE BLDV-MCNC: 1.6 MMOL/L — SIGNIFICANT CHANGE UP (ref 0.5–2)
LYMPHOCYTES # BLD AUTO: 1.51 K/UL — SIGNIFICANT CHANGE UP (ref 1.2–3.4)
LYMPHOCYTES # BLD AUTO: 33.6 % — SIGNIFICANT CHANGE UP (ref 20.5–51.1)
MCHC RBC-ENTMCNC: 30.7 PG — SIGNIFICANT CHANGE UP (ref 27–31)
MCHC RBC-ENTMCNC: 33.1 G/DL — SIGNIFICANT CHANGE UP (ref 32–37)
MCV RBC AUTO: 92.7 FL — SIGNIFICANT CHANGE UP (ref 81–99)
MONOCYTES # BLD AUTO: 0.33 K/UL — SIGNIFICANT CHANGE UP (ref 0.1–0.6)
MONOCYTES NFR BLD AUTO: 7.3 % — SIGNIFICANT CHANGE UP (ref 1.7–9.3)
NEUTROPHILS # BLD AUTO: 2.41 K/UL — SIGNIFICANT CHANGE UP (ref 1.4–6.5)
NEUTROPHILS NFR BLD AUTO: 53.8 % — SIGNIFICANT CHANGE UP (ref 42.2–75.2)
NRBC # BLD: 0 /100 WBCS — SIGNIFICANT CHANGE UP (ref 0–0)
PCO2 BLDV: 43 MMHG — HIGH (ref 39–42)
PH BLDV: 7.41 — SIGNIFICANT CHANGE UP (ref 7.32–7.43)
PLATELET # BLD AUTO: 416 K/UL — HIGH (ref 130–400)
PO2 BLDV: 38 MMHG — SIGNIFICANT CHANGE UP
POTASSIUM BLDV-SCNC: 4.2 MMOL/L — SIGNIFICANT CHANGE UP (ref 3.5–5.1)
POTASSIUM SERPL-MCNC: 4.9 MMOL/L — SIGNIFICANT CHANGE UP (ref 3.5–5)
POTASSIUM SERPL-SCNC: 4.9 MMOL/L — SIGNIFICANT CHANGE UP (ref 3.5–5)
PROT SERPL-MCNC: 6.4 G/DL — SIGNIFICANT CHANGE UP (ref 6–8)
RBC # BLD: 3.81 M/UL — LOW (ref 4.2–5.4)
RBC # FLD: 13.9 % — SIGNIFICANT CHANGE UP (ref 11.5–14.5)
RSV RNA NPH QL NAA+NON-PROBE: SIGNIFICANT CHANGE UP
SAO2 % BLDV: 64 % — SIGNIFICANT CHANGE UP
SARS-COV-2 RNA SPEC QL NAA+PROBE: SIGNIFICANT CHANGE UP
SODIUM SERPL-SCNC: 139 MMOL/L — SIGNIFICANT CHANGE UP (ref 135–146)
WBC # BLD: 4.49 K/UL — LOW (ref 4.8–10.8)
WBC # FLD AUTO: 4.49 K/UL — LOW (ref 4.8–10.8)

## 2022-11-25 PROCEDURE — 71046 X-RAY EXAM CHEST 2 VIEWS: CPT | Mod: 26

## 2022-11-25 PROCEDURE — 99284 EMERGENCY DEPT VISIT MOD MDM: CPT

## 2022-11-25 RX ORDER — CEFTRIAXONE 500 MG/1
1000 INJECTION, POWDER, FOR SOLUTION INTRAMUSCULAR; INTRAVENOUS ONCE
Refills: 0 | Status: COMPLETED | OUTPATIENT
Start: 2022-11-25 | End: 2022-11-25

## 2022-11-25 RX ORDER — AZITHROMYCIN 500 MG/1
500 TABLET, FILM COATED ORAL ONCE
Refills: 0 | Status: COMPLETED | OUTPATIENT
Start: 2022-11-25 | End: 2022-11-25

## 2022-11-25 RX ADMIN — CEFTRIAXONE 100 MILLIGRAM(S): 500 INJECTION, POWDER, FOR SOLUTION INTRAMUSCULAR; INTRAVENOUS at 23:24

## 2022-11-25 RX ADMIN — AZITHROMYCIN 255 MILLIGRAM(S): 500 TABLET, FILM COATED ORAL at 23:24

## 2022-11-25 NOTE — ED ADULT TRIAGE NOTE - CHIEF COMPLAINT QUOTE
Pt came c/o fever and cough since last Wednesday, went to PCP who prescribed her Z-pack for 5 days without improvement, tested for COVID and flu which both were negative.

## 2022-11-26 VITALS — HEART RATE: 86 BPM | SYSTOLIC BLOOD PRESSURE: 109 MMHG | OXYGEN SATURATION: 98 % | DIASTOLIC BLOOD PRESSURE: 60 MMHG

## 2022-11-26 RX ORDER — AZITHROMYCIN 500 MG/1
1 TABLET, FILM COATED ORAL
Qty: 4 | Refills: 0
Start: 2022-11-26 | End: 2022-11-29

## 2022-11-26 RX ORDER — CEFDINIR 250 MG/5ML
1 POWDER, FOR SUSPENSION ORAL
Qty: 20 | Refills: 0
Start: 2022-11-26 | End: 2022-12-05

## 2022-11-26 NOTE — ED PROVIDER NOTE - PROGRESS NOTE DETAILS
patient is feeling well and no difficulty breathing.  SaO2 95% on RA. after share decision making, patient prefer to be discharged for outpatient abx. encourage patient to return ED for worsening/concerning symptoms.

## 2022-11-26 NOTE — ED PROVIDER NOTE - CLINICAL SUMMARY MEDICAL DECISION MAKING FREE TEXT BOX
7-year-old female with past medical history sarcoidosis, not on immunosuppressive therapy who presents to the ER for fever, chills, cough with clear/yellow sputum.  Symptoms initially started 10 days ago.  No vomiting, chest pain, shortness of breath, diarrhea, bloody or melanotic stool, abdominal pain, urinary symptoms.  Vitals noted, triage note reviewed, and exam as documented above.  Labs and imaging personally reviewed.  No leukocytosis, lactate 1.6 and patient has influenza A.  Opacities noted on chest x-ray.  Patient was given initial dose of broad-spectrum antibiotics.  Patient treated for community-acquired pneumonia, complicating influenza.  Patient well-appearing on reassessment, tolerating p.o. and vitals improved.  She feels well enough to discharge home with antibiotics and low threshold for return to the ER including any worsening of symptoms.  She was given follow-up instructions, and verbalized understanding.

## 2022-11-26 NOTE — ED PROVIDER NOTE - NSICDXPASTMEDICALHX_GEN_ALL_CORE_FT
PAST MEDICAL HISTORY:  Carpal tunnel syndrome     Herniated disc, cervical     Osteoarthritis     Patiño syndrome     Sarcoidosis     Vasovagal episode

## 2022-11-26 NOTE — ED PROVIDER NOTE - OBJECTIVE STATEMENT
68 yo female hx of sarcoidosis/OA an d Ancelmo's (no immunosuppress meds) present c/o fever/chill and cough for about 10 days. Tmax 103 but continue fever 101 earlier today so she comes to ED for evaluation. coughing with productive clear/yellow sputum. denies HA/sore throat/chest pain/abd pain/n/v/d and urinary sxs.

## 2022-11-26 NOTE — ED PROVIDER NOTE - CARE PROVIDER_API CALL
Obie Obrien)  Critical Care Medicine; Internal Medicine; Pulmonary Disease; Sleep Medicine  [address] [place] [address]  Phone: [phone]  Fax: [phone]  Follow Up Time:

## 2022-11-26 NOTE — ED PROVIDER NOTE - PHYSICAL EXAMINATION
CONSTITUTIONAL: Well-appearing; well-nourished; in no apparent distress.   EYES: PERRL; EOM intact.   CARDIOVASCULAR: Normal S1, S2; no murmurs, rubs, or gallops.   RESPIRATORY: faint crackles to b/l lower lung field. Normal chest excursion with respiration; breath sounds clear and equal bilaterally; no wheezing. RR - 16. no accessory muscles use,   GI/: Normal bowel sounds; non-distended; non-tender; no palpable organomegaly.   MS: No calf swelling and tenderness.  SKIN: Normal for age and race; warm; dry; good turgor; no apparent lesions or exudate.   NEURO/PSYCH: A & O x 4; grossly unremarkable.

## 2022-11-26 NOTE — ED PROVIDER NOTE - DISPOSITION TYPE
You can access the FollowMyHealth Patient Portal offered by [facility] by registering at the following website: [email or web address]. By joining [employer]’s FollowMyHealth portal, you will also be able to view your health information using other applications (apps) compatible with our system. DISCHARGE

## 2022-11-26 NOTE — ED PROVIDER NOTE - NSFOLLOWUPINSTRUCTIONS_ED_ALL_ED_FT
Pneumonia    Pneumonia is an infection of the lungs. Pneumonia may be caused by bacteria, viruses, or funguses. Symptoms include coughing, fever, chest pain when breathing deeply or coughing, shortness of breath, fatigue, or muscle aches. Pneumonia can be diagnosed with a medical history and physical exam, as well as other tests which may include a chest X-ray. If you were prescribed an antibiotic medicine, take it as told by your health care provider and do not stop taking the antibiotic even if you start to feel better. Do not use tobacco products, including cigarettes, chewing tobacco, and e-cigarettes.    SEEK IMMEDIATE MEDICAL CARE IF YOU HAVE THE FOLLOWING SYMPTOMS: worsening shortness of breath, worsening chest pain, coughing up blood, change in mental status, lightheadedness/dizziness.    Influenza    WHAT YOU NEED TO KNOW:    Influenza (the flu) is an infection caused by the influenza virus. The flu is easily spread when an infected person coughs, sneezes, or has close contact with others. You may be able to spread the flu to others for 1 week or longer after signs or symptoms appear.     DISCHARGE INSTRUCTIONS:    Call your local emergency number (911 in the ) if:  You have trouble breathing, and your lips look purple or blue.  You have a seizure.    Call your doctor if:     You are dizzy, or you are urinating less or not at all.   You have a headache with a stiff neck, and you feel tired or confused.  You have new pain or pressure in your chest.  Your symptoms, such as shortness of breath, vomiting, or diarrhea, get worse.   Your symptoms, such as fever and coughing, seem to get better, but then get worse.   You have new muscle pain or weakness.  You have questions or concerns about your condition or care.    Medicines: You may need any of the following:     Acetaminophen decreases pain and fever. It is available without a doctor's order. Ask how much to take and how often to take it. Follow directions. Read the labels of all other medicines you are using to see if they also contain acetaminophen, or ask your doctor or pharmacist. Acetaminophen can cause liver damage if not taken correctly. Do not use more than 4 grams (4,000 milligrams) total of acetaminophen in one day.     NSAIDs, such as ibuprofen, help decrease swelling, pain, and fever. This medicine is available with or without a doctor's order. NSAIDs can cause stomach bleeding or kidney problems in certain people. If you take blood thinner medicine, always ask your healthcare provider if NSAIDs are safe for you. Always read the medicine label and follow directions.    Antivirals help fight a viral infection.    Take your medicine as directed. Contact your healthcare provider if you think your medicine is not helping or if you have side effects. Tell him or her if you are allergic to any medicine. Keep a list of the medicines, vitamins, and herbs you take. Include the amounts, and when and why you take them. Bring the list or the pill bottles to follow-up visits. Carry your medicine list with you in case of an emergency.    Rest as much as you can to help you recover.    Drink liquids as directed to help prevent dehydration. Ask how much liquid to drink each day and which liquids are best for you.    Prevent the spread of influenza:   Wash your hands often. Use soap and water. Wash your hands after you use the bathroom, change a child's diapers, or sneeze. Wash your hands before you prepare or eat food. Use gel hand cleanser that has 60% alcohol, when soap and water are not available. Do not touch your eyes, nose, or mouth unless you have washed your hands first.    Handwashing    Cover your mouth when you sneeze or cough. Cough into a tissue or the bend of your arm. If you use a tissue, throw it away immediately and wash your hands.     Clean shared items with a germ-killing . Clean table surfaces, doorknobs, and light switches. Do not share towels, silverware, and dishes with people who are sick. Wash bed sheets, towels, silverware, and dishes with soap and water.     Wear a mask over your mouth and nose if you are sick. The face mask may help protect others from becoming infected with the flu. Wear the mask when in common areas of your home or if you seek care with a healthcare provider.     Stay away from others if you are sick. Stay at home until 24 hours after your fever and symptoms are gone.    Influenza vaccine helps prevent influenza (flu). Everyone older than 6 months should get a yearly influenza vaccine. Get the vaccine as soon as it is available, usually in September or October each year.    Follow up with your healthcare provider as directed: Write down your questions so you remember to ask them during your visits.

## 2022-11-26 NOTE — ED PROVIDER NOTE - PATIENT PORTAL LINK FT
You can access the FollowMyHealth Patient Portal offered by [facility] by registering at the following website: [email or web address]. By joining [employer]’s FollowMyHealth portal, you will also be able to view your health information using other applications (apps) compatible with our system.

## 2022-11-26 NOTE — ED PROVIDER NOTE - CCCP TRG CHIEF CMPLNT
fever/cough
I personally performed the service described in the documentation recorded by the scribe in my presence, and it accurately and completely records my words and actions.

## 2022-11-26 NOTE — ED PROVIDER NOTE - NS ED ROS FT
Constitutional: + fever, chills, no recent weight loss, change in appetite or malaise  Eyes: no redness/discharge/pain/vision changes  ENT: no rhinorrhea/ear pain/sore throat  Cardiac: No chest pain, SOB or edema.  Respiratory:  + cough no respiratory distress  GI: No nausea, vomiting, diarrhea or abdominal pain.  : No dysuria, frequency, urgency or hematuria  MS: no pain to back or extremities, no loss of ROM, no weakness  Neuro: No headache or weakness. No LOC.  Skin: No skin rash.  Endocrine: No history of thyroid disease or diabetes.

## 2022-12-01 LAB
CULTURE RESULTS: SIGNIFICANT CHANGE UP
CULTURE RESULTS: SIGNIFICANT CHANGE UP
SPECIMEN SOURCE: SIGNIFICANT CHANGE UP
SPECIMEN SOURCE: SIGNIFICANT CHANGE UP

## 2023-02-10 PROBLEM — D86.9 SARCOIDOSIS, UNSPECIFIED: Chronic | Status: ACTIVE | Noted: 2022-11-26

## 2023-02-10 PROBLEM — K74.3 PRIMARY BILIARY CIRRHOSIS: Chronic | Status: ACTIVE | Noted: 2022-11-26

## 2023-03-13 ENCOUNTER — OUTPATIENT (OUTPATIENT)
Dept: OUTPATIENT SERVICES | Facility: HOSPITAL | Age: 68
LOS: 1 days | End: 2023-03-13
Payer: MEDICARE

## 2023-03-13 DIAGNOSIS — Z12.31 ENCOUNTER FOR SCREENING MAMMOGRAM FOR MALIGNANT NEOPLASM OF BREAST: ICD-10-CM

## 2023-03-13 PROCEDURE — 77067 SCR MAMMO BI INCL CAD: CPT | Mod: 26

## 2023-03-13 PROCEDURE — 77067 SCR MAMMO BI INCL CAD: CPT

## 2023-03-13 PROCEDURE — 77063 BREAST TOMOSYNTHESIS BI: CPT | Mod: 26

## 2023-03-13 PROCEDURE — 77063 BREAST TOMOSYNTHESIS BI: CPT

## 2023-03-14 DIAGNOSIS — Z12.31 ENCOUNTER FOR SCREENING MAMMOGRAM FOR MALIGNANT NEOPLASM OF BREAST: ICD-10-CM

## 2023-03-27 ENCOUNTER — RESULT REVIEW (OUTPATIENT)
Age: 68
End: 2023-03-27

## 2023-03-27 ENCOUNTER — OUTPATIENT (OUTPATIENT)
Dept: OUTPATIENT SERVICES | Facility: HOSPITAL | Age: 68
LOS: 1 days | End: 2023-03-27
Payer: MEDICARE

## 2023-03-27 DIAGNOSIS — R91.1 SOLITARY PULMONARY NODULE: ICD-10-CM

## 2023-03-27 DIAGNOSIS — D86.9 SARCOIDOSIS, UNSPECIFIED: ICD-10-CM

## 2023-03-27 PROCEDURE — 71250 CT THORAX DX C-: CPT | Mod: 26

## 2023-03-27 PROCEDURE — 71250 CT THORAX DX C-: CPT

## 2023-03-28 DIAGNOSIS — R91.1 SOLITARY PULMONARY NODULE: ICD-10-CM

## 2023-03-28 DIAGNOSIS — D86.9 SARCOIDOSIS, UNSPECIFIED: ICD-10-CM

## 2023-04-12 ENCOUNTER — APPOINTMENT (OUTPATIENT)
Dept: PEDIATRIC ALLERGY IMMUNOLOGY | Facility: CLINIC | Age: 68
End: 2023-04-12
Payer: MEDICARE

## 2023-04-12 VITALS
HEIGHT: 65 IN | WEIGHT: 137 LBS | SYSTOLIC BLOOD PRESSURE: 120 MMHG | DIASTOLIC BLOOD PRESSURE: 80 MMHG | BODY MASS INDEX: 22.82 KG/M2

## 2023-04-12 DIAGNOSIS — J30.89 OTHER ALLERGIC RHINITIS: ICD-10-CM

## 2023-04-12 DIAGNOSIS — Z91.018 ALLERGY TO OTHER FOODS: ICD-10-CM

## 2023-04-12 PROCEDURE — 99203 OFFICE O/P NEW LOW 30 MIN: CPT | Mod: 25

## 2023-04-12 PROCEDURE — 95004 PERQ TESTS W/ALRGNC XTRCS: CPT

## 2023-04-12 NOTE — HISTORY OF PRESENT ILLNESS
[None] : The patient is currently asymptomatic [de-identified] : AKBAR AGUILERA is a 67 year  old female with a history of nasal congestion, post nasal drip and cough going back 20 years. She managed over the years with her PCP with nasal sprays and oral antihistamines. Two years ago she saw Dr. Mares, where she had turbinate reduction and clarifix with minimal to no relief. She has had evaluations for her various health issues since 1979. Sarcoidosis, Sjogren Syndrome, Osteoarthritis and Raynaud's syndrome managed by her rheumatologist. She states she has constant swelling of her hands and ankles. She is here to see if allergies are the source of her congestion or swelling.

## 2023-04-12 NOTE — ASSESSMENT
[FreeTextEntry1] : The patient is a 67 year female with a history suggestive of non Allergic Rhinitis and potential food allergy. Allergy skin testing showed no significant positive reactions to environmental allergens, discussed avoidance measures including dust mite proof encasings and HEPA air purifier. I also discussed the importance of following up with her pulmonologist and with all her immunological issues. I will see her as needed.

## 2023-04-18 ENCOUNTER — APPOINTMENT (OUTPATIENT)
Dept: PULMONOLOGY | Facility: CLINIC | Age: 68
End: 2023-04-18
Payer: MEDICARE

## 2023-04-18 VITALS
SYSTOLIC BLOOD PRESSURE: 120 MMHG | HEART RATE: 72 BPM | BODY MASS INDEX: 22.99 KG/M2 | RESPIRATION RATE: 14 BRPM | DIASTOLIC BLOOD PRESSURE: 80 MMHG | WEIGHT: 138 LBS | HEIGHT: 65 IN | OXYGEN SATURATION: 99 %

## 2023-04-18 DIAGNOSIS — D86.9 SARCOIDOSIS, UNSPECIFIED: ICD-10-CM

## 2023-04-18 PROCEDURE — 99213 OFFICE O/P EST LOW 20 MIN: CPT

## 2023-04-18 RX ORDER — IPRATROPIUM BROMIDE 42 UG/1
0.06 SPRAY NASAL 3 TIMES DAILY
Qty: 1 | Refills: 3 | Status: COMPLETED | COMMUNITY
Start: 2021-02-04 | End: 2023-04-18

## 2023-04-18 RX ORDER — CHROMIUM 200 MCG
TABLET ORAL
Refills: 0 | Status: COMPLETED | COMMUNITY
End: 2023-04-18

## 2023-04-18 RX ORDER — PANTOPRAZOLE 40 MG/1
40 TABLET, DELAYED RELEASE ORAL
Qty: 30 | Refills: 3 | Status: COMPLETED | COMMUNITY
Start: 2021-03-08 | End: 2023-04-18

## 2023-04-18 RX ORDER — DIAZEPAM 10 MG/1
10 TABLET ORAL
Qty: 2 | Refills: 0 | Status: COMPLETED | COMMUNITY
Start: 2021-03-22 | End: 2023-04-18

## 2023-04-18 RX ORDER — PREDNISONE 20 MG/1
20 TABLET ORAL
Qty: 15 | Refills: 0 | Status: COMPLETED | COMMUNITY
Start: 2023-03-28 | End: 2023-04-18

## 2023-04-18 RX ORDER — PANTOPRAZOLE 40 MG/1
40 TABLET, DELAYED RELEASE ORAL
Qty: 30 | Refills: 3 | Status: COMPLETED | COMMUNITY
Start: 2021-02-04 | End: 2023-04-18

## 2023-04-18 RX ORDER — LEVOCETIRIZINE DIHYDROCHLORIDE 5 MG/1
5 TABLET ORAL DAILY
Qty: 30 | Refills: 2 | Status: COMPLETED | COMMUNITY
Start: 2021-02-04 | End: 2023-04-18

## 2023-04-18 RX ORDER — FAMOTIDINE 40 MG/1
40 TABLET, FILM COATED ORAL
Qty: 30 | Refills: 3 | Status: COMPLETED | COMMUNITY
Start: 2021-03-08 | End: 2023-04-18

## 2023-04-18 RX ORDER — AZITHROMYCIN 250 MG/1
250 TABLET, FILM COATED ORAL DAILY
Qty: 1 | Refills: 0 | Status: COMPLETED | COMMUNITY
Start: 2023-03-28 | End: 2023-04-18

## 2023-04-18 NOTE — DISCUSSION/SUMMARY
[FreeTextEntry1] : SARCOIDOSIS CHEST CT REVIEWED\par PFT\par FEELS WELL\par COUGH/ PND/ MUCINEX\par LUNG NODULE\par SMOKER

## 2023-04-18 NOTE — HISTORY OF PRESENT ILLNESS
[TextBox_4] : SAW MAC BEFORE SARCOIDOSIS/ SP MED, SJOGREN ON PLAQUENIL WAS ON MTX NOW OFF ON PLQUENIL\par SP COVID DOING WELL\par SP REPEAT CT\par COUGH/ PND

## 2023-04-18 NOTE — REASON FOR VISIT
[Follow-Up] : a follow-up visit [Cough] : cough [Shortness of Breath] : shortness of breath [Sarcoidosis] : sarcoidosis [TextBox_44] : SARCOIDOSIS/ SMOKER

## 2023-05-09 NOTE — ED ADULT NURSE NOTE - WHEN FALL OCCURRED
[facility]  Group Therapy    Date of Service: 5/9/2023  Start time: 1100  Stop time: 1150  Type of session: Therapy Group    Problem number: 1Dep. F33.1    Short term goal (STG): C .  Pt will identify times she has been able to use willingness to turn towards emotions and symptoms and how acceptance of the moment can help her get through crises.     Intervention/techniques: Informed, Validated/Supported, Listened/Empathized, Observed/Monitored, Clarified, Reinforced, and Provided Feedback    Patient mental status/affect: Calm and Congruent    Patient behavior/appearance: Attentive, Cooperative, Motivated, and Caretaking    Special patient treatment accommodations provided (describe): None    Patient response and progress towards goals: Focus of session was on identifying the difference between mindless responses and mindful responses. We spoke about the impact that either has on our moods and our functioning. We spoke about the benefits of mindfulness which included being non judging, based on facts, can distance self from thoughts, being in an approach mode, can let go, and are more calm and effective in any given moment. Pt listened to and participated in the group discussion. Pt shared that she feels content by feeling gratitude toward God for her many blessings. She is thankful each day when she wakes up and has another day. She is trying to take life as it comes and not get too anxious as she feels like it is not worth it. this admission

## 2023-09-21 NOTE — ED ADULT NURSE NOTE - NSSISCREENINGQ3_ED_A_ED
Quality 226: Preventive Care And Screening: Tobacco Use: Screening And Cessation Intervention: Patient screened for tobacco use and is an ex/non-smoker Quality 130: Documentation Of Current Medications In The Medical Record: Current Medications Documented Detail Level: Detailed Quality 110: Preventive Care And Screening: Influenza Immunization: Influenza immunization was not ordered or administered, reason not given Quality 431: Preventive Care And Screening: Unhealthy Alcohol Use - Screening: Patient not identified as an unhealthy alcohol user when screened for unhealthy alcohol use using a systematic screening method No

## 2023-11-20 ENCOUNTER — APPOINTMENT (OUTPATIENT)
Dept: PULMONOLOGY | Facility: CLINIC | Age: 68
End: 2023-11-20
Payer: MEDICARE

## 2023-11-20 VITALS
BODY MASS INDEX: 22.99 KG/M2 | WEIGHT: 138 LBS | HEIGHT: 65 IN | SYSTOLIC BLOOD PRESSURE: 118 MMHG | DIASTOLIC BLOOD PRESSURE: 80 MMHG | HEART RATE: 58 BPM

## 2023-11-20 DIAGNOSIS — M35.00 SICCA SYNDROME, UNSPECIFIED: ICD-10-CM

## 2023-11-20 DIAGNOSIS — R09.82 POSTNASAL DRIP: ICD-10-CM

## 2023-11-20 DIAGNOSIS — R91.1 SOLITARY PULMONARY NODULE: ICD-10-CM

## 2023-11-20 PROCEDURE — 94727 GAS DIL/WSHOT DETER LNG VOL: CPT

## 2023-11-20 PROCEDURE — 99213 OFFICE O/P EST LOW 20 MIN: CPT | Mod: 25

## 2023-11-20 PROCEDURE — 94729 DIFFUSING CAPACITY: CPT

## 2023-11-20 PROCEDURE — 94010 BREATHING CAPACITY TEST: CPT

## 2023-12-12 ENCOUNTER — OUTPATIENT (OUTPATIENT)
Dept: OUTPATIENT SERVICES | Facility: HOSPITAL | Age: 68
LOS: 1 days | End: 2023-12-12
Payer: MEDICARE

## 2023-12-12 ENCOUNTER — RESULT REVIEW (OUTPATIENT)
Age: 68
End: 2023-12-12

## 2023-12-12 DIAGNOSIS — Z00.8 ENCOUNTER FOR OTHER GENERAL EXAMINATION: ICD-10-CM

## 2023-12-12 DIAGNOSIS — R91.1 SOLITARY PULMONARY NODULE: ICD-10-CM

## 2023-12-12 PROCEDURE — 71250 CT THORAX DX C-: CPT | Mod: 26

## 2023-12-12 PROCEDURE — 71250 CT THORAX DX C-: CPT

## 2023-12-13 DIAGNOSIS — R91.1 SOLITARY PULMONARY NODULE: ICD-10-CM

## 2024-03-14 ENCOUNTER — RESULT REVIEW (OUTPATIENT)
Age: 69
End: 2024-03-14

## 2024-03-14 ENCOUNTER — OUTPATIENT (OUTPATIENT)
Dept: OUTPATIENT SERVICES | Facility: HOSPITAL | Age: 69
LOS: 1 days | End: 2024-03-14
Payer: MEDICARE

## 2024-03-14 DIAGNOSIS — Z12.31 ENCOUNTER FOR SCREENING MAMMOGRAM FOR MALIGNANT NEOPLASM OF BREAST: ICD-10-CM

## 2024-03-14 PROCEDURE — 77063 BREAST TOMOSYNTHESIS BI: CPT | Mod: 26

## 2024-03-14 PROCEDURE — 77067 SCR MAMMO BI INCL CAD: CPT | Mod: 26

## 2024-03-14 PROCEDURE — 77063 BREAST TOMOSYNTHESIS BI: CPT

## 2024-03-14 PROCEDURE — 77067 SCR MAMMO BI INCL CAD: CPT

## 2024-03-15 DIAGNOSIS — Z12.31 ENCOUNTER FOR SCREENING MAMMOGRAM FOR MALIGNANT NEOPLASM OF BREAST: ICD-10-CM

## 2024-11-19 ENCOUNTER — APPOINTMENT (OUTPATIENT)
Dept: PULMONOLOGY | Facility: CLINIC | Age: 69
End: 2024-11-19
Payer: MEDICARE

## 2024-11-19 VITALS
SYSTOLIC BLOOD PRESSURE: 116 MMHG | DIASTOLIC BLOOD PRESSURE: 82 MMHG | OXYGEN SATURATION: 98 % | WEIGHT: 135 LBS | HEIGHT: 65 IN | HEART RATE: 71 BPM | BODY MASS INDEX: 22.49 KG/M2

## 2024-11-19 DIAGNOSIS — D86.9 SARCOIDOSIS, UNSPECIFIED: ICD-10-CM

## 2024-11-19 DIAGNOSIS — R09.82 POSTNASAL DRIP: ICD-10-CM

## 2024-11-19 DIAGNOSIS — R91.1 SOLITARY PULMONARY NODULE: ICD-10-CM

## 2024-11-19 PROCEDURE — G2211 COMPLEX E/M VISIT ADD ON: CPT

## 2024-11-19 PROCEDURE — 99213 OFFICE O/P EST LOW 20 MIN: CPT

## 2024-11-19 RX ORDER — AZELASTINE HYDROCHLORIDE 137 UG/1
0.1 SPRAY, METERED NASAL DAILY
Qty: 1 | Refills: 5 | Status: ACTIVE | COMMUNITY
Start: 2024-11-19 | End: 1900-01-01

## 2024-11-19 RX ORDER — FLUTICASONE PROPIONATE 50 UG/1
50 SPRAY, METERED NASAL
Qty: 1 | Refills: 5 | Status: ACTIVE | COMMUNITY
Start: 2024-11-19 | End: 1900-01-01

## 2024-11-26 ENCOUNTER — APPOINTMENT (OUTPATIENT)
Dept: ORTHOPEDIC SURGERY | Facility: CLINIC | Age: 69
End: 2024-11-26

## 2024-12-05 ENCOUNTER — APPOINTMENT (OUTPATIENT)
Dept: ORTHOPEDIC SURGERY | Facility: CLINIC | Age: 69
End: 2024-12-05

## 2024-12-21 ENCOUNTER — RESULT REVIEW (OUTPATIENT)
Age: 69
End: 2024-12-21

## 2024-12-21 ENCOUNTER — OUTPATIENT (OUTPATIENT)
Dept: OUTPATIENT SERVICES | Facility: HOSPITAL | Age: 69
LOS: 1 days | End: 2024-12-21
Payer: MEDICARE

## 2024-12-21 DIAGNOSIS — Z00.8 ENCOUNTER FOR OTHER GENERAL EXAMINATION: ICD-10-CM

## 2024-12-21 DIAGNOSIS — M35.00 SJOGREN SYNDROME, UNSPECIFIED: ICD-10-CM

## 2024-12-22 DIAGNOSIS — M35.00 SJOGREN SYNDROME, UNSPECIFIED: ICD-10-CM

## 2025-01-30 ENCOUNTER — APPOINTMENT (OUTPATIENT)
Dept: ORTHOPEDIC SURGERY | Facility: CLINIC | Age: 70
End: 2025-01-30

## 2025-01-30 VITALS — BODY MASS INDEX: 20.61 KG/M2 | WEIGHT: 136 LBS | HEIGHT: 68 IN

## 2025-01-30 DIAGNOSIS — M79.672 PAIN IN LEFT FOOT: ICD-10-CM

## 2025-01-30 PROCEDURE — 73630 X-RAY EXAM OF FOOT: CPT | Mod: LT

## 2025-01-30 PROCEDURE — 99203 OFFICE O/P NEW LOW 30 MIN: CPT

## 2025-02-04 ENCOUNTER — APPOINTMENT (OUTPATIENT)
Dept: ORTHOPEDIC SURGERY | Facility: CLINIC | Age: 70
End: 2025-02-04
Payer: MEDICARE

## 2025-02-04 ENCOUNTER — RESULT REVIEW (OUTPATIENT)
Age: 70
End: 2025-02-04

## 2025-02-04 ENCOUNTER — RESULT CHARGE (OUTPATIENT)
Age: 70
End: 2025-02-04

## 2025-02-04 ENCOUNTER — OUTPATIENT (OUTPATIENT)
Dept: OUTPATIENT SERVICES | Facility: HOSPITAL | Age: 70
LOS: 1 days | End: 2025-02-04
Payer: MEDICARE

## 2025-02-04 DIAGNOSIS — Z00.8 ENCOUNTER FOR OTHER GENERAL EXAMINATION: ICD-10-CM

## 2025-02-04 DIAGNOSIS — M17.12 UNILATERAL PRIMARY OSTEOARTHRITIS, LEFT KNEE: ICD-10-CM

## 2025-02-04 DIAGNOSIS — M79.672 PAIN IN LEFT FOOT: ICD-10-CM

## 2025-02-04 PROCEDURE — 73718 MRI LOWER EXTREMITY W/O DYE: CPT | Mod: 26,LT

## 2025-02-04 PROCEDURE — 99214 OFFICE O/P EST MOD 30 MIN: CPT

## 2025-02-04 PROCEDURE — 73718 MRI LOWER EXTREMITY W/O DYE: CPT | Mod: LT

## 2025-02-04 PROCEDURE — 73560 X-RAY EXAM OF KNEE 1 OR 2: CPT | Mod: 50

## 2025-02-04 RX ORDER — HYLAN G-F 20 16MG/2ML
48 SYRINGE (ML) INTRAARTICULAR
Qty: 1 | Refills: 0 | Status: ACTIVE | OUTPATIENT
Start: 2025-02-04

## 2025-02-05 DIAGNOSIS — M79.672 PAIN IN LEFT FOOT: ICD-10-CM

## 2025-02-12 ENCOUNTER — APPOINTMENT (OUTPATIENT)
Dept: ORTHOPEDIC SURGERY | Facility: CLINIC | Age: 70
End: 2025-02-12
Payer: MEDICARE

## 2025-02-12 DIAGNOSIS — M20.12 HALLUX VALGUS (ACQUIRED), LEFT FOOT: ICD-10-CM

## 2025-02-12 DIAGNOSIS — G57.62 LESION OF PLANTAR NERVE, LEFT LOWER LIMB: ICD-10-CM

## 2025-02-12 PROCEDURE — 20550 NJX 1 TENDON SHEATH/LIGAMENT: CPT | Mod: LT

## 2025-02-12 PROCEDURE — 99204 OFFICE O/P NEW MOD 45 MIN: CPT | Mod: 25

## 2025-03-11 ENCOUNTER — APPOINTMENT (OUTPATIENT)
Dept: ORTHOPEDIC SURGERY | Facility: CLINIC | Age: 70
End: 2025-03-11
Payer: MEDICARE

## 2025-03-11 DIAGNOSIS — M17.12 UNILATERAL PRIMARY OSTEOARTHRITIS, LEFT KNEE: ICD-10-CM

## 2025-03-11 PROCEDURE — 20610 DRAIN/INJ JOINT/BURSA W/O US: CPT | Mod: LT

## 2025-03-14 ENCOUNTER — NON-APPOINTMENT (OUTPATIENT)
Age: 70
End: 2025-03-14

## 2025-03-14 ENCOUNTER — APPOINTMENT (OUTPATIENT)
Dept: CARDIOLOGY | Facility: CLINIC | Age: 70
End: 2025-03-14
Payer: MEDICARE

## 2025-03-14 VITALS
WEIGHT: 136 LBS | HEART RATE: 68 BPM | BODY MASS INDEX: 20.61 KG/M2 | SYSTOLIC BLOOD PRESSURE: 120 MMHG | HEIGHT: 68 IN | DIASTOLIC BLOOD PRESSURE: 76 MMHG

## 2025-03-14 DIAGNOSIS — R07.9 CHEST PAIN, UNSPECIFIED: ICD-10-CM

## 2025-03-14 DIAGNOSIS — R00.2 PALPITATIONS: ICD-10-CM

## 2025-03-14 DIAGNOSIS — D86.9 SARCOIDOSIS, UNSPECIFIED: ICD-10-CM

## 2025-03-14 DIAGNOSIS — D64.9 ANEMIA, UNSPECIFIED: ICD-10-CM

## 2025-03-14 DIAGNOSIS — R06.02 SHORTNESS OF BREATH: ICD-10-CM

## 2025-03-14 PROCEDURE — 99204 OFFICE O/P NEW MOD 45 MIN: CPT | Mod: 25

## 2025-03-14 PROCEDURE — 93000 ELECTROCARDIOGRAM COMPLETE: CPT

## 2025-03-14 RX ORDER — GABAPENTIN 300 MG/1
300 CAPSULE ORAL 3 TIMES DAILY
Qty: 42 | Refills: 1 | Status: ACTIVE | COMMUNITY

## 2025-03-14 RX ORDER — HYDROXYCHLOROQUINE SULFATE 200 MG/1
200 TABLET, FILM COATED ORAL DAILY
Refills: 0 | Status: ACTIVE | COMMUNITY

## 2025-03-25 ENCOUNTER — OUTPATIENT (OUTPATIENT)
Dept: OUTPATIENT SERVICES | Facility: HOSPITAL | Age: 70
LOS: 1 days | End: 2025-03-25
Payer: MEDICARE

## 2025-03-25 DIAGNOSIS — Z12.31 ENCOUNTER FOR SCREENING MAMMOGRAM FOR MALIGNANT NEOPLASM OF BREAST: ICD-10-CM

## 2025-03-25 DIAGNOSIS — Z00.8 ENCOUNTER FOR OTHER GENERAL EXAMINATION: ICD-10-CM

## 2025-03-25 PROCEDURE — 77067 SCR MAMMO BI INCL CAD: CPT | Mod: 26

## 2025-03-25 PROCEDURE — 77063 BREAST TOMOSYNTHESIS BI: CPT | Mod: 26

## 2025-03-25 PROCEDURE — 77067 SCR MAMMO BI INCL CAD: CPT

## 2025-03-25 PROCEDURE — 77063 BREAST TOMOSYNTHESIS BI: CPT

## 2025-03-26 ENCOUNTER — APPOINTMENT (OUTPATIENT)
Dept: CARDIOLOGY | Facility: CLINIC | Age: 70
End: 2025-03-26
Payer: MEDICARE

## 2025-03-26 DIAGNOSIS — Z12.31 ENCOUNTER FOR SCREENING MAMMOGRAM FOR MALIGNANT NEOPLASM OF BREAST: ICD-10-CM

## 2025-03-26 PROCEDURE — 93306 TTE W/DOPPLER COMPLETE: CPT

## 2025-03-26 PROCEDURE — ZZZZZ: CPT

## 2025-04-01 ENCOUNTER — APPOINTMENT (OUTPATIENT)
Dept: PULMONOLOGY | Facility: CLINIC | Age: 70
End: 2025-04-01
Payer: MEDICARE

## 2025-04-01 VITALS
SYSTOLIC BLOOD PRESSURE: 120 MMHG | RESPIRATION RATE: 14 BRPM | BODY MASS INDEX: 20.46 KG/M2 | OXYGEN SATURATION: 98 % | HEIGHT: 68 IN | DIASTOLIC BLOOD PRESSURE: 80 MMHG | WEIGHT: 135 LBS | HEART RATE: 74 BPM

## 2025-04-01 DIAGNOSIS — K21.9 GASTRO-ESOPHAGEAL REFLUX DISEASE W/OUT ESOPHAGITIS: ICD-10-CM

## 2025-04-01 DIAGNOSIS — R06.02 SHORTNESS OF BREATH: ICD-10-CM

## 2025-04-01 DIAGNOSIS — D86.9 SARCOIDOSIS, UNSPECIFIED: ICD-10-CM

## 2025-04-01 PROCEDURE — 99213 OFFICE O/P EST LOW 20 MIN: CPT

## 2025-04-01 PROCEDURE — G2211 COMPLEX E/M VISIT ADD ON: CPT

## 2025-04-17 ENCOUNTER — APPOINTMENT (OUTPATIENT)
Dept: CARDIOLOGY | Facility: CLINIC | Age: 70
End: 2025-04-17
Payer: MEDICARE

## 2025-04-17 PROCEDURE — 93015 CV STRESS TEST SUPVJ I&R: CPT

## 2025-04-18 ENCOUNTER — APPOINTMENT (OUTPATIENT)
Dept: ORTHOPEDIC SURGERY | Facility: CLINIC | Age: 70
End: 2025-04-18

## 2025-07-22 ENCOUNTER — NON-APPOINTMENT (OUTPATIENT)
Age: 70
End: 2025-07-22